# Patient Record
Sex: FEMALE | Race: WHITE | NOT HISPANIC OR LATINO | Employment: STUDENT | RURAL
[De-identification: names, ages, dates, MRNs, and addresses within clinical notes are randomized per-mention and may not be internally consistent; named-entity substitution may affect disease eponyms.]

---
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
## 2022-08-22 NOTE — PROGRESS NOTES
Subjective   Katie Johnson is a 22 y.o. female. Presents to [facility]    Chief Complaint   Patient presents with   • Menstrual Problem       Menstrual Problem  This is a chronic problem. The current episode started more than 1 month ago. The problem occurs intermittently. The problem has been waxing and waning. Pertinent negatives include no abdominal pain, arthralgias, change in bowel habit, chest pain, coughing, fatigue, fever, headaches, nausea, rash, vomiting or weakness. Nothing aggravates the symptoms. She has tried nothing for the symptoms. The treatment provided no relief.      She is still taking her ortho micronor. However she forgets it on occasion and she spots. She has had some heavy menses.     I personally reviewed and updated the patient's allergies, medications, problem list, and past medical, surgical, social, and family history. I have reviewed and confirmed the accuracy of the History of Present Illness and Review of Symptoms as documented by the MA/LPN/RN. Leda Andrews MD    Allergies:  No Known Allergies    Social History:  Social History     Socioeconomic History   • Marital status: Single   Tobacco Use   • Smoking status: Never Smoker   • Smokeless tobacco: Never Used   • Tobacco comment: No smoke exposure       Family History:  Family History   Problem Relation Age of Onset   • Hypertension Father    • Diabetes Father    • Diabetes Paternal Grandfather    • Colon cancer Neg Hx    • Breast cancer Neg Hx        Past Medical History :  Patient Active Problem List   Diagnosis   • Scoliosis (and kyphoscoliosis), idiopathic   • Infected insect bite of right knee   • Junctional nevus of back   • Adolescent idiopathic scoliosis of thoracolumbar spine   • S/P spinal fusion   • Encounter for surveillance of contraceptives   • Dizziness   • Screening for hyperlipidemia   • Routine general medical examination at a health care facility   • Irregular menses       Medication  List:  No current outpatient medications on file.    Past Surgical History:  No past surgical history on file.    Review of Systems:  Review of Systems   Constitutional: Negative for activity change, fatigue and fever.   HENT: Negative for ear pain, rhinorrhea, sinus pressure and voice change.    Eyes: Negative for visual disturbance.   Respiratory: Negative for cough and shortness of breath.    Cardiovascular: Negative for chest pain.   Gastrointestinal: Negative for abdominal pain, change in bowel habit, diarrhea, nausea and vomiting.   Endocrine: Negative for cold intolerance and heat intolerance.   Genitourinary: Positive for menstrual problem. Negative for frequency and urgency.   Musculoskeletal: Negative for arthralgias.   Skin: Negative for rash.   Neurological: Negative for syncope and weakness.   Hematological: Does not bruise/bleed easily.   Psychiatric/Behavioral: Negative for depressed mood. The patient is not nervous/anxious.        Physical Exam:      Vital Signs:    Vitals:    08/24/22 1531   BP: 102/72   Pulse: 108   Resp: 18   Temp: 97.5 °F (36.4 °C)   SpO2: 99%        Wt Readings from Last 3 Encounters:   08/24/22 55.4 kg (122 lb 3.2 oz)   07/27/21 54.4 kg (120 lb) (35 %, Z= -0.39)*   07/16/21 55.1 kg (121 lb 6.4 oz) (38 %, Z= -0.31)*     * Growth percentiles are based on CDC (Girls, 2-20 Years) data.       Result Review :                Physical Exam  Vitals reviewed.   Constitutional:       Appearance: Normal appearance. She is well-developed.   HENT:      Head: Normocephalic and atraumatic.   Eyes:      General:         Right eye: No discharge.         Left eye: No discharge.   Cardiovascular:      Rate and Rhythm: Normal rate and regular rhythm.      Heart sounds: Normal heart sounds. No murmur heard.    No friction rub. No gallop.   Pulmonary:      Effort: Pulmonary effort is normal. No respiratory distress.      Breath sounds: Normal breath sounds. No wheezing or rales.   Abdominal:       General: Abdomen is flat. Bowel sounds are normal. There is no distension.      Palpations: Abdomen is soft. There is no mass.      Tenderness: There is no abdominal tenderness. There is no guarding or rebound.      Hernia: No hernia is present.   Skin:     General: Skin is warm and dry.      Findings: No rash.   Neurological:      Mental Status: She is alert and oriented to person, place, and time.      Coordination: Coordination normal.      Gait: Gait normal.   Psychiatric:         Behavior: Behavior is cooperative.       Medications were reviewed and stopped her ortho micronor. . Labs and tests ordered      CBC & Differential                               Lab Collect, Specimen Types - Blood;, Resulting Agency - [facility] (AMBULATORY) Collection Date-8/24/2022 Collection Time- 4:04 PM, New collection                                         POC Pregnancy, Urine                              Routine                                 TSH                              Lab Collect, Specimen Types - Blood;, Resulting Agency - [facility] ([facility]UL[facility]) Collection Date-8/24/2022 Collection Time- 4:04 PM, New collection                         US Non-ob Transvaginal                              Future, Expected: 8/29/2022, Expires: 8/24/2023, Routine, Hospital Performed  Reason for Exam: irreg menses                 US Pelvis Complete                              Future, Expected: 8/29/2022, Expires: 8/24/2023, Routine, Hospital Performed  Reason for Exam: irreg menses with menorrhagia         Assessment and Plan:  Problems Addressed this Visit        Genitourinary and Reproductive     Irregular menses - Primary     Will get labs and u/s  She has been on ortho micronor and this is most likely the reason. Stop it and see if it helps. Advised to abstain from sex if possible or use a condom in the mean time.          Relevant Orders    CBC & Differential (Completed)    TSH (Completed)    US Pelvis Complete    US  Non-ob Transvaginal    POC Pregnancy, Urine (Completed)      Diagnoses       Codes Comments    Irregular menses    -  Primary ICD-10-CM: N92.6  ICD-9-CM: 626.4            BMI is within normal parameters. No other follow-up for BMI required.      An After Visit Summary and PPPS were given to the patient.       I wore protective equipment throughout this patient encounter to include mask and eyewear. Hand hygiene was performed before donning protective equipment and after removal when leaving the room.

## 2022-08-24 ENCOUNTER — OFFICE VISIT (OUTPATIENT)
Dept: FAMILY MEDICINE CLINIC | Facility: CLINIC | Age: 22
End: 2022-08-24

## 2022-08-24 VITALS
HEART RATE: 108 BPM | DIASTOLIC BLOOD PRESSURE: 72 MMHG | SYSTOLIC BLOOD PRESSURE: 102 MMHG | RESPIRATION RATE: 18 BRPM | BODY MASS INDEX: 19.18 KG/M2 | OXYGEN SATURATION: 99 % | HEIGHT: 67 IN | WEIGHT: 122.2 LBS | TEMPERATURE: 97.5 F

## 2022-08-24 DIAGNOSIS — N92.6 IRREGULAR MENSES: Primary | ICD-10-CM

## 2022-08-24 PROBLEM — D62 POSTOPERATIVE ANEMIA DUE TO ACUTE BLOOD LOSS: Status: RESOLVED | Noted: 2020-09-19 | Resolved: 2022-08-24

## 2022-08-24 LAB
B-HCG UR QL: NEGATIVE
EXPIRATION DATE: NORMAL
INTERNAL NEGATIVE CONTROL: NORMAL
INTERNAL POSITIVE CONTROL: NORMAL
Lab: NORMAL

## 2022-08-24 PROCEDURE — 99213 OFFICE O/P EST LOW 20 MIN: CPT | Performed by: FAMILY MEDICINE

## 2022-08-24 PROCEDURE — 81025 URINE PREGNANCY TEST: CPT | Performed by: FAMILY MEDICINE

## 2022-08-25 LAB
BASOPHILS # BLD AUTO: 0.1 X10E3/UL (ref 0–0.2)
BASOPHILS NFR BLD AUTO: 1 %
EOSINOPHIL # BLD AUTO: 0.2 X10E3/UL (ref 0–0.4)
EOSINOPHIL NFR BLD AUTO: 3 %
ERYTHROCYTE [DISTWIDTH] IN BLOOD BY AUTOMATED COUNT: 12.5 % (ref 11.7–15.4)
HCT VFR BLD AUTO: 40.6 % (ref 34–46.6)
HGB BLD-MCNC: 13.6 G/DL (ref 11.1–15.9)
IMM GRANULOCYTES # BLD AUTO: 0 X10E3/UL (ref 0–0.1)
IMM GRANULOCYTES NFR BLD AUTO: 0 %
LYMPHOCYTES # BLD AUTO: 2.3 X10E3/UL (ref 0.7–3.1)
LYMPHOCYTES NFR BLD AUTO: 31 %
MCH RBC QN AUTO: 30.2 PG (ref 26.6–33)
MCHC RBC AUTO-ENTMCNC: 33.5 G/DL (ref 31.5–35.7)
MCV RBC AUTO: 90 FL (ref 79–97)
MONOCYTES # BLD AUTO: 0.7 X10E3/UL (ref 0.1–0.9)
MONOCYTES NFR BLD AUTO: 9 %
NEUTROPHILS # BLD AUTO: 4.3 X10E3/UL (ref 1.4–7)
NEUTROPHILS NFR BLD AUTO: 56 %
PLATELET # BLD AUTO: 214 X10E3/UL (ref 150–450)
RBC # BLD AUTO: 4.51 X10E6/UL (ref 3.77–5.28)
TSH SERPL DL<=0.005 MIU/L-ACNC: 1.04 UIU/ML (ref 0.45–4.5)
WBC # BLD AUTO: 7.6 X10E3/UL (ref 3.4–10.8)

## 2022-08-30 NOTE — ASSESSMENT & PLAN NOTE
Will get labs and u/s  She has been on ortho micronor and this is most likely the reason. Stop it and see if it helps. Advised to abstain from sex if possible or use a condom in the mean time.

## 2022-09-06 ENCOUNTER — HOSPITAL ENCOUNTER (OUTPATIENT)
Dept: ULTRASOUND IMAGING | Facility: HOSPITAL | Age: 22
End: 2022-09-06

## 2022-09-07 NOTE — PROGRESS NOTES
"  Chief Complaint   Patient presents with   • Annual Exam         Subjective   Katie Johnson is a 22 y.o. female here for a Annual Visit.     Last Physical Exam: 07/27/2021 Previous physical was performed by  Leda Andrews MD  General Health:good  Contraceptive History:none  Nutrition:in general, a \"healthy\" diet    Exercise Level:not at all  Sleep:fairly well  Hours of Sleep:6  Libido:fair  Self Skin Exam: occasionally  Monthly Breast Self Exam:Does not perform monthly breast exam    Pap Smear:  Last Completed Pap Smear     This patient has no relevant Health Maintenance data.       Findings on last pap: patient has never had a pap test        History of Present Illness     I personally reviewed and updated the patient's allergies, medications, problem list, and past medical, surgical, social, and family history.     Allergies:  No Known Allergies    Social History:  Social History     Socioeconomic History   • Marital status: Single   Tobacco Use   • Smoking status: Never Smoker   • Smokeless tobacco: Never Used   • Tobacco comment: No smoke exposure       Family History:  Family History   Problem Relation Age of Onset   • Hypertension Father    • Diabetes Father    • Diabetes Paternal Grandfather    • Colon cancer Neg Hx    • Breast cancer Neg Hx        Past Medical History :  Active Ambulatory Problems     Diagnosis Date Noted   • Scoliosis (and kyphoscoliosis), idiopathic 09/30/2015   • Infected insect bite of right knee 05/28/2020   • Junctional nevus of back 06/04/2020   • Adolescent idiopathic scoliosis of thoracolumbar spine 07/17/2020   • S/P spinal fusion 02/11/2021   • Dizziness 07/16/2021   • Screening for hyperlipidemia 07/27/2021   • Routine general medical examination at a health care facility 07/27/2021   • Irregular menses 08/24/2022   • Birth control counseling 09/08/2022   • HPV vaccine counseling 09/08/2022   • Skin lesion of back 09/08/2022     Resolved Ambulatory Problems     Diagnosis " "Date Noted   • Postoperative anemia due to acute blood loss 09/19/2020   • Encounter for surveillance of contraceptives 07/16/2021     Past Medical History:   Diagnosis Date   • Cellulitis of knee, left    • Lice infested hair    • Other viral warts    • Plantar wart, right foot        Medication List:  No current outpatient medications on file.    Past Surgical History:  No past surgical history on file.    Depression Screen:   PHQ-2/PHQ-9 Depression Screening 8/24/2022   Retired Total Score -   Little Interest or Pleasure in Doing Things 0-->not at all   Feeling Down, Depressed or Hopeless 0-->not at all   PHQ-9: Brief Depression Severity Measure Score 0       Fall Risk Screen:  SANDRA Fall Risk Assessment has not been completed.    Review Of Systems:  Review of Systems   Constitutional: Negative for activity change, appetite change and fever.   HENT: Negative for rhinorrhea.    Gastrointestinal: Negative for constipation and diarrhea.   Genitourinary: Negative for dysuria and hematuria.   Neurological: Negative for dizziness and confusion.   Psychiatric/Behavioral: The patient is not nervous/anxious.        Physical Exam:  Vital Signs:  Visit Vitals  /70 (BP Location: Right arm, Patient Position: Sitting, Cuff Size: Adult)   Pulse 112   Temp 97.7 °F (36.5 °C) (Temporal)   Resp 18   Ht 170.2 cm (67\")   Wt 55.1 kg (121 lb 6.4 oz)   LMP 08/11/2022   SpO2 99% Comment: room air   BMI 19.01 kg/m²       Body mass index is 19.01 kg/m².      Result Review :            Physical Exam  Vitals and nursing note reviewed.   Constitutional:       General: She is not in acute distress.     Appearance: She is well-developed. She is not diaphoretic.   HENT:      Head: Normocephalic and atraumatic.      Right Ear: Tympanic membrane and external ear normal.      Left Ear: Tympanic membrane and external ear normal.      Nose: Nose normal.      Mouth/Throat:      Mouth: Mucous membranes are moist.      Pharynx: No oropharyngeal " exudate.   Eyes:      General: No scleral icterus.        Right eye: No discharge.         Left eye: No discharge.      Conjunctiva/sclera: Conjunctivae normal.      Pupils: Pupils are equal, round, and reactive to light.   Neck:      Thyroid: No thyromegaly.      Trachea: No tracheal deviation.   Cardiovascular:      Rate and Rhythm: Normal rate and regular rhythm.      Heart sounds: Normal heart sounds. No murmur heard.    No friction rub. No gallop.   Pulmonary:      Effort: Pulmonary effort is normal. No respiratory distress.      Breath sounds: Normal breath sounds. No stridor. No wheezing or rales.   Abdominal:      General: Bowel sounds are normal. There is no distension.      Palpations: Abdomen is soft. There is no mass.      Tenderness: There is no abdominal tenderness. There is no guarding or rebound.   Musculoskeletal:         General: No tenderness or deformity. Normal range of motion.      Cervical back: Normal range of motion and neck supple.   Lymphadenopathy:      Cervical: No cervical adenopathy.   Skin:     General: Skin is warm and dry.      Capillary Refill: Capillary refill takes less than 2 seconds.      Coloration: Skin is not pale.      Findings: No erythema or rash.      Comments: Papule, (looks like scarring) left upper back   Neurological:      Mental Status: She is alert and oriented to person, place, and time.      Cranial Nerves: No cranial nerve deficit.      Sensory: No sensory deficit.      Motor: No tremor, atrophy or abnormal muscle tone.      Coordination: Coordination normal.      Gait: Gait normal.      Deep Tendon Reflexes: Reflexes are normal and symmetric. Reflexes normal.   Psychiatric:         Behavior: Behavior normal.         Thought Content: Thought content normal.         Cognition and Memory: Memory is not impaired. She does not exhibit impaired recent memory or impaired remote memory.         Judgment: Judgment normal.           Assessment and Plan:  Problem List  Items Addressed This Visit        Cardiac and Vasculature    Screening for hyperlipidemia       Genitourinary and Reproductive     Birth control counseling    HPV vaccine counseling       Health Encounters    Routine general medical examination at a health care facility - Primary    Relevant Orders    POCT urinalysis dipstick, manual (Completed)    CBC & Differential    Comprehensive Metabolic Panel    TSH       Skin    Skin lesion of back    Overview     Left upper back: benign appearance, declines derm  She would like to monitor           Other Visit Diagnoses     Need for hepatitis C screening test        Relevant Orders    Hepatitis C Antibody    Screening for chlamydial disease        Relevant Orders    Chlamydia trachomatis, Neisseria gonorrhoeae, PCR - Urine, Urine, Clean Catch (Completed)          BMI is within normal parameters. No other follow-up for BMI required.       An After Visit Summary and PPPS were given to the patient.       Discussed injury prevention, diet and exercise, safe sexual practices, and screening for common diseases. Encouraged use of sunscreen and seatbelts. Discussed timing of  cervical cancer screening. Encouraged monthly self-breast exams, yearly clinical breast exams, and discussed timing of mammograms. Avoidance of tobacco encouraged. Limitation or avoidance of alcohol encouraged. Recommend yearly dental and eye exams. Also discussed monitoring of blood pressure, lipids.     I wore protective equipment throughout this patient encounter to include mask. Hand hygiene was performed before donning protective equipment and after removal when leaving the room.

## 2022-09-08 ENCOUNTER — OFFICE VISIT (OUTPATIENT)
Dept: FAMILY MEDICINE CLINIC | Facility: CLINIC | Age: 22
End: 2022-09-08

## 2022-09-08 VITALS
HEIGHT: 67 IN | BODY MASS INDEX: 19.06 KG/M2 | OXYGEN SATURATION: 99 % | WEIGHT: 121.4 LBS | TEMPERATURE: 97.7 F | RESPIRATION RATE: 18 BRPM | SYSTOLIC BLOOD PRESSURE: 110 MMHG | DIASTOLIC BLOOD PRESSURE: 70 MMHG | HEART RATE: 112 BPM

## 2022-09-08 DIAGNOSIS — Z11.8 SCREENING FOR CHLAMYDIAL DISEASE: ICD-10-CM

## 2022-09-08 DIAGNOSIS — Z13.220 SCREENING FOR HYPERLIPIDEMIA: ICD-10-CM

## 2022-09-08 DIAGNOSIS — L98.9 SKIN LESION OF BACK: ICD-10-CM

## 2022-09-08 DIAGNOSIS — Z71.85 HPV VACCINE COUNSELING: ICD-10-CM

## 2022-09-08 DIAGNOSIS — Z11.59 NEED FOR HEPATITIS C SCREENING TEST: ICD-10-CM

## 2022-09-08 DIAGNOSIS — Z30.09 BIRTH CONTROL COUNSELING: ICD-10-CM

## 2022-09-08 DIAGNOSIS — Z00.00 ROUTINE GENERAL MEDICAL EXAMINATION AT A HEALTH CARE FACILITY: Primary | ICD-10-CM

## 2022-09-08 PROBLEM — Z30.40 ENCOUNTER FOR SURVEILLANCE OF CONTRACEPTIVES: Status: RESOLVED | Noted: 2021-07-16 | Resolved: 2022-09-08

## 2022-09-08 LAB
BILIRUB BLD-MCNC: NEGATIVE MG/DL
CLARITY, POC: CLEAR
COLOR UR: YELLOW
GLUCOSE UR STRIP-MCNC: NEGATIVE MG/DL
KETONES UR QL: NEGATIVE
LEUKOCYTE EST, POC: NEGATIVE
NITRITE UR-MCNC: NEGATIVE MG/ML
PH UR: 5 [PH] (ref 5–8)
PROT UR STRIP-MCNC: NEGATIVE MG/DL
RBC # UR STRIP: NEGATIVE /UL
SP GR UR: 1.01 (ref 1–1.03)
UROBILINOGEN UR QL: NORMAL

## 2022-09-08 PROCEDURE — 99395 PREV VISIT EST AGE 18-39: CPT | Performed by: FAMILY MEDICINE

## 2022-09-08 PROCEDURE — 81002 URINALYSIS NONAUTO W/O SCOPE: CPT | Performed by: FAMILY MEDICINE

## 2022-09-10 LAB
C TRACH RRNA SPEC QL NAA+PROBE: NEGATIVE
N GONORRHOEA RRNA SPEC QL NAA+PROBE: NEGATIVE

## 2022-09-13 ENCOUNTER — HOSPITAL ENCOUNTER (OUTPATIENT)
Dept: ULTRASOUND IMAGING | Facility: HOSPITAL | Age: 22
Discharge: HOME | End: 2022-09-13
Admitting: FAMILY MEDICINE

## 2022-09-13 DIAGNOSIS — N92.6 IRREGULAR MENSES: ICD-10-CM

## 2022-09-13 PROCEDURE — 76856 US EXAM PELVIC COMPLETE: CPT

## 2022-09-13 PROCEDURE — 76830 TRANSVAGINAL US NON-OB: CPT

## 2023-01-03 NOTE — PROGRESS NOTES
Subjective   Katie Johnson is a 23 y.o. female. Presents to [facility]    Chief Complaint   Patient presents with   • Skin Lesion   • Contraception       History of Present Illness  Lesion:   Katie Johnson is here today for a lesion. The lesion appeared gradual and has been occurring for a couple years.. The course has been decreasing in size. The lesion is characterized as no sign of infection. The lesion is located on back. The symptoms have been associated with none.     Contraception:  Katie Johnson is here today discuss contraception methods. She is -0, Parity-0. LMP- 2022 She is of childbearing age She is sexually active.  The reason for contraception include pregnancy prevention and cycle control Associated symptoms include none. Mentruation pattern is described as regular, predictable and occur monthly. Current contraceptive treatment includes no method at present time.          I personally reviewed and updated the patient's allergies, medications, problem list, and past medical, surgical, social, and family history. I have reviewed and confirmed the accuracy of the History of Present Illness and Review of Symptoms as documented by the MA/LPN/RN. Leda Andrews MD    Allergies:  No Known Allergies    Social History:  Social History     Socioeconomic History   • Marital status: Single   Tobacco Use   • Smoking status: Never   • Smokeless tobacco: Never   • Tobacco comments:     No smoke exposure       Family History:  Family History   Problem Relation Age of Onset   • Hypertension Father    • Diabetes Father    • Diabetes Paternal Grandfather    • Colon cancer Neg Hx    • Breast cancer Neg Hx        Past Medical History :  Patient Active Problem List   Diagnosis   • Scoliosis (and kyphoscoliosis), idiopathic   • Infected insect bite of right knee   • Junctional nevus of back   • Adolescent idiopathic scoliosis of thoracolumbar spine   • S/P spinal fusion   •  Dizziness   • Screening for hyperlipidemia   • Routine general medical examination at a health care facility   • Irregular menses   • Birth control counseling   • HPV vaccine counseling   • Skin lesion of back       Medication List:    Current Outpatient Medications:   •  norethindrone (Ortho Micronor) 0.35 MG tablet, Take 1 tablet by mouth Daily., Disp: 28 tablet, Rfl: 12    Past Surgical History:  No past surgical history on file.      Physical Exam:      Vital Signs:    Vitals:    01/05/23 1216   BP: 120/80   Pulse: 100   Resp: 18   Temp: 97.5 °F (36.4 °C)   SpO2: 99%        Wt Readings from Last 3 Encounters:   01/05/23 54.6 kg (120 lb 6.4 oz)   09/08/22 55.1 kg (121 lb 6.4 oz)   08/24/22 55.4 kg (122 lb 3.2 oz)       Result Review :                Physical Exam  Vitals reviewed.   Constitutional:       Appearance: Normal appearance. She is well-developed.   HENT:      Head: Normocephalic and atraumatic.   Eyes:      General:         Right eye: No discharge.         Left eye: No discharge.   Cardiovascular:      Rate and Rhythm: Normal rate and regular rhythm.      Heart sounds: Normal heart sounds. No murmur heard.    No friction rub. No gallop.   Pulmonary:      Effort: Pulmonary effort is normal. No respiratory distress.      Breath sounds: Normal breath sounds. No wheezing or rales.   Skin:     General: Skin is warm and dry.      Findings: No rash.      Comments: Nodule left post left shoulder, keloid like papule   Neurological:      Mental Status: She is alert and oriented to person, place, and time.      Coordination: Coordination normal.      Gait: Gait normal.   Psychiatric:         Behavior: Behavior is cooperative.         Assessment and Plan:  Problems Addressed this Visit        Genitourinary and Reproductive     Birth control counseling     Discussed birth control pills, its usage and its benefits of shorter lighter periods.  She has no personal or family history of DVT or PE.               Skin     Skin lesion of back     Left upper back: benign appearance, declines derm  She would like to monitor        Other Visit Diagnoses     Encounter for other general counseling or advice on contraception    -  Primary    Restart orthomicronor    Relevant Medications    norethindrone (Ortho Micronor) 0.35 MG tablet    Other Relevant Orders    POCT pregnancy, urine (Completed)      Diagnoses       Codes Comments    Encounter for other general counseling or advice on contraception    -  Primary ICD-10-CM: Z30.09  ICD-9-CM: V25.09 Restart orthomicronor    Skin lesion of back     ICD-10-CM: L98.9  ICD-9-CM: 709.9     Birth control counseling     ICD-10-CM: Z30.09  ICD-9-CM: V25.09            BMI is within normal parameters. No other follow-up for BMI required.      An After Visit Summary and PPPS were given to the patient.       I wore protective equipment throughout this patient encounter to include mask and eyewear. Hand hygiene was performed before donning protective equipment and after removal when leaving the room.

## 2023-01-05 ENCOUNTER — OFFICE VISIT (OUTPATIENT)
Dept: FAMILY MEDICINE CLINIC | Facility: CLINIC | Age: 23
End: 2023-01-05
Payer: COMMERCIAL

## 2023-01-05 VITALS
RESPIRATION RATE: 18 BRPM | BODY MASS INDEX: 18.9 KG/M2 | HEART RATE: 100 BPM | WEIGHT: 120.4 LBS | HEIGHT: 67 IN | DIASTOLIC BLOOD PRESSURE: 80 MMHG | OXYGEN SATURATION: 99 % | TEMPERATURE: 97.5 F | SYSTOLIC BLOOD PRESSURE: 120 MMHG

## 2023-01-05 DIAGNOSIS — L98.9 SKIN LESION OF BACK: ICD-10-CM

## 2023-01-05 DIAGNOSIS — Z30.09 BIRTH CONTROL COUNSELING: ICD-10-CM

## 2023-01-05 DIAGNOSIS — Z30.09 ENCOUNTER FOR OTHER GENERAL COUNSELING OR ADVICE ON CONTRACEPTION: Primary | ICD-10-CM

## 2023-01-05 PROCEDURE — 99213 OFFICE O/P EST LOW 20 MIN: CPT | Performed by: FAMILY MEDICINE

## 2023-01-05 PROCEDURE — 81025 URINE PREGNANCY TEST: CPT | Performed by: FAMILY MEDICINE

## 2023-01-05 RX ORDER — ACETAMINOPHEN AND CODEINE PHOSPHATE 120; 12 MG/5ML; MG/5ML
1 SOLUTION ORAL DAILY
Qty: 28 TABLET | Refills: 12 | Status: SHIPPED | OUTPATIENT
Start: 2023-01-05 | End: 2024-01-05

## 2023-01-09 NOTE — ASSESSMENT & PLAN NOTE
Discussed birth control pills, its usage and its benefits of shorter lighter periods.  She has no personal or family history of DVT or PE.

## 2023-05-02 ENCOUNTER — TELEPHONE (OUTPATIENT)
Dept: FAMILY MEDICINE CLINIC | Facility: CLINIC | Age: 23
End: 2023-05-02
Payer: COMMERCIAL

## 2023-05-02 NOTE — TELEPHONE ENCOUNTER
Called and spoke to patient about message regarding lab orders. She stated another place requested those orders and they were ordering them.

## 2023-05-02 NOTE — TELEPHONE ENCOUNTER
Caller: Katie Johnson    Relationship: Self    Best call back number: [phone]    What orders are you requesting (i.e. lab or imaging): CBC AND CMP ORDERS     In what timeframe would the patient need to come in: ASAP    Where will you receive your lab/imaging services: IN OFFICE     Additional notes:     PATIENT GOT STUCK WITH A NEEDLE AT HER JOB.  SHE HAD TO GET BLOOD WORK DONE TO MAKE SURE SHE WAS CLEAN, SHE IS. SHE HAD THESE RAN 2 WEEKS AGO.    WHILE DOING THESE LABS THEY NOTICED ELEVATED CBC AND CMP AND THE DOCTOR AT [facility] WANTED HER TO GET THESE RECHECKED.      SHE HAS ORDERS IN HAND TO GET THESE REDRAWN BUT WONDERED IF OUR OFFICE TOOK OUTSIDE ORDERS.    PLEASE GIVE HER A CALLBACK

## 2023-11-13 ENCOUNTER — TELEPHONE (OUTPATIENT)
Dept: FAMILY MEDICINE CLINIC | Facility: CLINIC | Age: 23
End: 2023-11-13
Payer: COMMERCIAL

## 2023-11-13 NOTE — TELEPHONE ENCOUNTER
LVM     ALL PHYSICAL AND WELLNESS VISITS NEED TO BE WITH PCP  THIS APPT NEED TO RESCHEDULE WITH WITH DR MONIE SAINI TO READ

## 2023-12-06 NOTE — PROGRESS NOTES
"  Office Note     Name: Katie Johnson    : 2000     MRN: [id number]     Chief Complaint  URI    Subjective     Katie Johnson presents to [facility] for an acute complaint of URI.     History of Present Illness  Katie recently went to [facility] after hours. They tested her for covid , flu and strep and states all were negative. She is very fatigued. She has ran a fever, highest has been 100.5. This  was last Monday and Tuesday.  URI   This is a new problem. The current episode started in the past 7 days. The problem has been unchanged. The maximum temperature recorded prior to her arrival was 100.4 - 100.9 F. Associated symptoms include congestion, coughing and a sore throat.     No fever today.  Productive cough with green mucus x 1 week. Coughing x 2 weeks. No CXR at urgent care. Face pressure, congestion, throat is sore from coughing, and she is wheezing. She feels like her insides are \"raw\" due to the amount of coughing. She does have some ear popping. She has muscle aches and fatigue.    She  has taken otc- cold and flu, a mucus relief product and mucus nasal spray.     She does not have seasonal allergies.    She does not smoke.   She is a dialysis nurse.     Discussed antibiotics may decrease efficacy of OCP.  Use backup method.      Current Outpatient Medications:     norethindrone (Ortho Micronor) 0.35 MG tablet, Take 1 tablet by mouth Daily., Disp: 28 tablet, Rfl: 12    albuterol sulfate  (90 Base) MCG/ACT inhaler, Inhale 2 puffs Every 4 (Four) Hours As Needed for Wheezing., Disp: 6.7 g, Rfl: 0    amoxicillin-clavulanate (AUGMENTIN) 875-125 MG per tablet, Take 1 tablet by mouth 2 (Two) Times a Day., Disp: 20 tablet, Rfl: 0    brompheniramine-pseudoephedrine-DM 30-2-10 MG/5ML syrup, Take 10 mL by mouth 4 (Four) Times a Day As Needed for Congestion or Cough., Disp: 300 mL, Rfl: 0    methylPREDNISolone (MEDROL) 4 MG dose pack, Take as directed on " "package instructions., Disp: 21 tablet, Rfl: 0    No Known Allergies    History reviewed. No pertinent surgical history.     Family History   Problem Relation Age of Onset    Hypertension Father     Diabetes Father     Diabetes Paternal Grandfather     Colon cancer Neg Hx     Breast cancer Neg Hx             8/24/2022     3:33 PM   PHQ-2/PHQ-9 Depression Screening   Little Interest or Pleasure in Doing Things 0-->not at all   Feeling Down, Depressed or Hopeless 0-->not at all   PHQ-9: Brief Depression Severity Measure Score 0       Review of Systems   HENT:  Positive for congestion and sore throat.    Respiratory:  Positive for cough.        Objective     /78 (BP Location: Right arm, Patient Position: Sitting, Cuff Size: Adult)   Pulse 109   Temp 98 °F (36.7 °C) (Temporal)   Resp 18   Ht 170.2 cm (67.01\")   Wt 54 kg (119 lb)   SpO2 97%   BMI 18.63 kg/m²     BP Readings from Last 2 Encounters:   12/08/23 112/78   01/05/23 120/80       Wt Readings from Last 2 Encounters:   12/08/23 54 kg (119 lb)   01/05/23 54.6 kg (120 lb 6.4 oz)       BMI is within normal parameters. No other follow-up for BMI required.         Physical Exam  Vitals and nursing note reviewed.   Constitutional:       General: She is not in acute distress.     Appearance: She is well-groomed. She is ill-appearing. She is not toxic-appearing or diaphoretic.   HENT:      Head: Normocephalic and atraumatic.      Right Ear: Ear canal and external ear normal. A middle ear effusion is present.      Left Ear: Ear canal and external ear normal. A middle ear effusion is present.      Nose: Congestion and rhinorrhea present.      Right Sinus: Maxillary sinus tenderness and frontal sinus tenderness present.      Left Sinus: Maxillary sinus tenderness and frontal sinus tenderness present.      Mouth/Throat:      Lips: Pink. No lesions.      Pharynx: Uvula midline. Posterior oropharyngeal erythema present.      Tonsils: 0 on the right. 0 on the left. "   Eyes:      General: Lids are normal. Vision grossly intact. Gaze aligned appropriately. Allergic shiner present. No visual field deficit.        Right eye: No discharge.         Left eye: No discharge.      Extraocular Movements: Extraocular movements intact.      Pupils: Pupils are equal, round, and reactive to light. Pupils are equal.   Neck:      Vascular: No carotid bruit.   Cardiovascular:      Rate and Rhythm: Normal rate and regular rhythm.      Heart sounds: Normal heart sounds. No murmur heard.  Pulmonary:      Effort: Pulmonary effort is normal. No respiratory distress.      Breath sounds: Normal air entry. No stridor or decreased air movement. No decreased breath sounds, wheezing, rhonchi or rales.   Chest:      Chest wall: No tenderness.   Musculoskeletal:      Cervical back: Normal range of motion and neck supple.   Lymphadenopathy:      Cervical: Cervical adenopathy present.   Skin:     General: Skin is warm and dry.   Neurological:      General: No focal deficit present.      Mental Status: She is alert and oriented to person, place, and time. Mental status is at baseline.   Psychiatric:         Attention and Perception: Attention and perception normal.         Mood and Affect: Mood normal.         Behavior: Behavior normal. Behavior is cooperative.       Derm Physical Exam  Result Review :{ Labs  Result Review  Imaging  Med Tab  Media   Assessment and Plan      {CC Problem List  Visit Diagnosis  ROS  Review (Popup)  Health Maintenance  Quality  BestPractice  Medications  SmartSets  SnapShot Encounters  Media   Problems Addressed this Visit    None  Visit Diagnoses       Acute URI    -  Primary    Relevant Medications    amoxicillin-clavulanate (AUGMENTIN) 875-125 MG per tablet    Other Relevant Orders    POCT SARS-CoV-2 Antigen JANI + Flu (Completed)    POCT rapid strep A (Completed)    Sore throat        Relevant Orders    POCT rapid strep A (Completed)    Fluid level behind  tympanic membrane of both ears        Relevant Medications    amoxicillin-clavulanate (AUGMENTIN) 875-125 MG per tablet    brompheniramine-pseudoephedrine-DM 30-2-10 MG/5ML syrup    methylPREDNISolone (MEDROL) 4 MG dose pack    Acute non-recurrent pansinusitis        Relevant Medications    amoxicillin-clavulanate (AUGMENTIN) 875-125 MG per tablet    methylPREDNISolone (MEDROL) 4 MG dose pack    Pharyngitis, unspecified etiology        Relevant Medications    amoxicillin-clavulanate (AUGMENTIN) 875-125 MG per tablet    brompheniramine-pseudoephedrine-DM 30-2-10 MG/5ML syrup          Diagnoses         Codes Comments    Acute URI    -  Primary ICD-10-CM: J06.9  ICD-9-CM: 465.9     Sore throat     ICD-10-CM: J02.9  ICD-9-CM: 462     Fluid level behind tympanic membrane of both ears     ICD-10-CM: H65.93  ICD-9-CM: 381.4     Acute non-recurrent pansinusitis     ICD-10-CM: J01.40  ICD-9-CM: 461.8     Pharyngitis, unspecified etiology     ICD-10-CM: J02.9  ICD-9-CM: 462            Procedures    Problem List Items Addressed This Visit    None  Visit Diagnoses       Acute URI    -  Primary    Relevant Medications    amoxicillin-clavulanate (AUGMENTIN) 875-125 MG per tablet    Other Relevant Orders    POCT SARS-CoV-2 Antigen JANI + Flu (Completed)    POCT rapid strep A (Completed)    Sore throat        Relevant Orders    POCT rapid strep A (Completed)    Fluid level behind tympanic membrane of both ears        Relevant Medications    amoxicillin-clavulanate (AUGMENTIN) 875-125 MG per tablet    brompheniramine-pseudoephedrine-DM 30-2-10 MG/5ML syrup    methylPREDNISolone (MEDROL) 4 MG dose pack    Acute non-recurrent pansinusitis        Relevant Medications    amoxicillin-clavulanate (AUGMENTIN) 875-125 MG per tablet    methylPREDNISolone (MEDROL) 4 MG dose pack    Pharyngitis, unspecified etiology        Relevant Medications    amoxicillin-clavulanate (AUGMENTIN) 875-125 MG per tablet    brompheniramine-pseudoephedrine-DM  30-2-10 MG/5ML syrup                 Wrapup Tab  Return for Follow up with primary care provider as needed..     Patient Instructions   Continue current plan of care as discussed.   Take medication as ordered (if applicable).    Change toothbrush after 24 hour antibiotic use.  Gargle with warm salt water for symptomatic relief of sore throat.   Stay hydrated.     Practice good sleep hygiene.  Eat a well balanced diet with fresh fruit and vegetables.    Drink at least 8 bottles of water or equivalent per day.     Limit sweetened beverages, sodas, juices.    Bake, boil, broil or grill your food, avoid eating fried foods.   Exercise at least 150 minutes per week.      Patient was given instructions and counseling regarding her condition or for health maintenance advice. Please see specific information pulled into the AVS if appropriate.  Hand hygiene was performed during entrance to exam room and following assessment of patient. This document is intended for medical expert use only.     EMR Dragon/Transcription disclaimer:   Much of this encounter note is an electronic transcription/translation of spoken language to printed text. The electronic translation of spoken language may permit erroneous, or at times, nonsensical words or phrases to be inadvertently transcribed.      ARLIN Mirza, FNP-C  [facility] [address]  [facility]  [address] [place] [address] [address]  [place] [address] [phone]

## 2023-12-08 ENCOUNTER — OFFICE VISIT (OUTPATIENT)
Dept: FAMILY MEDICINE CLINIC | Facility: CLINIC | Age: 23
End: 2023-12-08
Payer: COMMERCIAL

## 2023-12-08 VITALS
OXYGEN SATURATION: 97 % | HEIGHT: 67 IN | WEIGHT: 119 LBS | SYSTOLIC BLOOD PRESSURE: 112 MMHG | DIASTOLIC BLOOD PRESSURE: 78 MMHG | HEART RATE: 109 BPM | TEMPERATURE: 98 F | RESPIRATION RATE: 18 BRPM | BODY MASS INDEX: 18.68 KG/M2

## 2023-12-08 DIAGNOSIS — J02.9 SORE THROAT: ICD-10-CM

## 2023-12-08 DIAGNOSIS — J01.40 ACUTE NON-RECURRENT PANSINUSITIS: ICD-10-CM

## 2023-12-08 DIAGNOSIS — H65.93 FLUID LEVEL BEHIND TYMPANIC MEMBRANE OF BOTH EARS: ICD-10-CM

## 2023-12-08 DIAGNOSIS — J02.9 PHARYNGITIS, UNSPECIFIED ETIOLOGY: ICD-10-CM

## 2023-12-08 DIAGNOSIS — J06.9 ACUTE URI: Primary | ICD-10-CM

## 2023-12-08 LAB
EXPIRATION DATE: NORMAL
EXPIRATION DATE: NORMAL
FLUAV AG UPPER RESP QL IA.RAPID: NOT DETECTED
FLUBV AG UPPER RESP QL IA.RAPID: NOT DETECTED
INTERNAL CONTROL: NORMAL
INTERNAL CONTROL: NORMAL
Lab: NORMAL
Lab: NORMAL
S PYO AG THROAT QL: NEGATIVE
SARS-COV-2 AG UPPER RESP QL IA.RAPID: NOT DETECTED

## 2023-12-08 PROCEDURE — 87428 SARSCOV & INF VIR A&B AG IA: CPT

## 2023-12-08 PROCEDURE — 99213 OFFICE O/P EST LOW 20 MIN: CPT

## 2023-12-08 PROCEDURE — 87880 STREP A ASSAY W/OPTIC: CPT

## 2023-12-08 RX ORDER — ALBUTEROL SULFATE 90 UG/1
2 AEROSOL, METERED RESPIRATORY (INHALATION) EVERY 4 HOURS PRN
Qty: 6.7 G | Refills: 0 | Status: SHIPPED | OUTPATIENT
Start: 2023-12-08

## 2023-12-08 RX ORDER — BROMPHENIRAMINE MALEATE, PSEUDOEPHEDRINE HYDROCHLORIDE, AND DEXTROMETHORPHAN HYDROBROMIDE 2; 30; 10 MG/5ML; MG/5ML; MG/5ML
10 SYRUP ORAL 4 TIMES DAILY PRN
Qty: 300 ML | Refills: 0 | Status: SHIPPED | OUTPATIENT
Start: 2023-12-08 | End: 2023-12-11

## 2023-12-08 RX ORDER — METHYLPREDNISOLONE 4 MG/1
TABLET ORAL
Qty: 21 TABLET | Refills: 0 | Status: SHIPPED | OUTPATIENT
Start: 2023-12-08

## 2023-12-08 RX ORDER — AMOXICILLIN AND CLAVULANATE POTASSIUM 875; 125 MG/1; MG/1
1 TABLET, FILM COATED ORAL 2 TIMES DAILY
Qty: 20 TABLET | Refills: 0 | Status: SHIPPED | OUTPATIENT
Start: 2023-12-08

## 2023-12-11 ENCOUNTER — OFFICE VISIT (OUTPATIENT)
Dept: FAMILY MEDICINE CLINIC | Facility: CLINIC | Age: 23
End: 2023-12-11
Payer: COMMERCIAL

## 2023-12-11 VITALS
OXYGEN SATURATION: 97 % | HEART RATE: 120 BPM | WEIGHT: 120.6 LBS | TEMPERATURE: 97.1 F | SYSTOLIC BLOOD PRESSURE: 110 MMHG | DIASTOLIC BLOOD PRESSURE: 70 MMHG | HEIGHT: 67 IN | RESPIRATION RATE: 18 BRPM | BODY MASS INDEX: 18.93 KG/M2

## 2023-12-11 DIAGNOSIS — Z12.4 SCREENING FOR CERVICAL CANCER: ICD-10-CM

## 2023-12-11 DIAGNOSIS — Z13.220 SCREENING FOR HYPERLIPIDEMIA: ICD-10-CM

## 2023-12-11 DIAGNOSIS — Z00.00 ENCOUNTER FOR WELLNESS EXAMINATION: Primary | ICD-10-CM

## 2023-12-11 PROCEDURE — 99395 PREV VISIT EST AGE 18-39: CPT | Performed by: FAMILY MEDICINE

## 2023-12-13 LAB
ALBUMIN SERPL-MCNC: 4.9 G/DL (ref 4–5)
ALBUMIN/GLOB SERPL: 1.5 {RATIO} (ref 1.2–2.2)
ALP SERPL-CCNC: 70 IU/L (ref 44–121)
ALT SERPL-CCNC: 17 IU/L (ref 0–32)
AST SERPL-CCNC: 19 IU/L (ref 0–40)
BASOPHILS # BLD AUTO: 0.1 X10E3/UL (ref 0–0.2)
BASOPHILS NFR BLD AUTO: 1 %
BILIRUB SERPL-MCNC: 1.8 MG/DL (ref 0–1.2)
BUN SERPL-MCNC: 10 MG/DL (ref 6–20)
BUN/CREAT SERPL: 13 (ref 9–23)
CALCIUM SERPL-MCNC: 10 MG/DL (ref 8.7–10.2)
CHLORIDE SERPL-SCNC: 100 MMOL/L (ref 96–106)
CHOLEST SERPL-MCNC: 157 MG/DL (ref 100–199)
CHOLEST/HDLC SERPL: 3.4 RATIO (ref 0–4.4)
CO2 SERPL-SCNC: 24 MMOL/L (ref 20–29)
CREAT SERPL-MCNC: 0.8 MG/DL (ref 0.57–1)
EGFRCR SERPLBLD CKD-EPI 2021: 107 ML/MIN/1.73
EOSINOPHIL # BLD AUTO: 0.1 X10E3/UL (ref 0–0.4)
EOSINOPHIL NFR BLD AUTO: 1 %
ERYTHROCYTE [DISTWIDTH] IN BLOOD BY AUTOMATED COUNT: 12 % (ref 11.7–15.4)
GLOBULIN SER CALC-MCNC: 3.2 G/DL (ref 1.5–4.5)
GLUCOSE SERPL-MCNC: 87 MG/DL (ref 70–99)
HCT VFR BLD AUTO: 43.6 % (ref 34–46.6)
HDLC SERPL-MCNC: 46 MG/DL
HGB BLD-MCNC: 14.5 G/DL (ref 11.1–15.9)
IMM GRANULOCYTES # BLD AUTO: 0 X10E3/UL (ref 0–0.1)
IMM GRANULOCYTES NFR BLD AUTO: 0 %
LDLC SERPL CALC-MCNC: 93 MG/DL (ref 0–99)
LYMPHOCYTES # BLD AUTO: 2.7 X10E3/UL (ref 0.7–3.1)
LYMPHOCYTES NFR BLD AUTO: 29 %
MCH RBC QN AUTO: 29.6 PG (ref 26.6–33)
MCHC RBC AUTO-ENTMCNC: 33.3 G/DL (ref 31.5–35.7)
MCV RBC AUTO: 89 FL (ref 79–97)
MONOCYTES # BLD AUTO: 0.7 X10E3/UL (ref 0.1–0.9)
MONOCYTES NFR BLD AUTO: 7 %
NEUTROPHILS # BLD AUTO: 6 X10E3/UL (ref 1.4–7)
NEUTROPHILS NFR BLD AUTO: 62 %
PLATELET # BLD AUTO: 259 X10E3/UL (ref 150–450)
POTASSIUM SERPL-SCNC: 3.7 MMOL/L (ref 3.5–5.2)
PROT SERPL-MCNC: 8.1 G/DL (ref 6–8.5)
RBC # BLD AUTO: 4.9 X10E6/UL (ref 3.77–5.28)
SODIUM SERPL-SCNC: 141 MMOL/L (ref 134–144)
TRIGL SERPL-MCNC: 96 MG/DL (ref 0–149)
TSH SERPL DL<=0.005 MIU/L-ACNC: 1.13 UIU/ML (ref 0.45–4.5)
VLDLC SERPL CALC-MCNC: 18 MG/DL (ref 5–40)
WBC # BLD AUTO: 9.6 X10E3/UL (ref 3.4–10.8)

## 2023-12-14 LAB
CONV .: NORMAL
CYTOLOGIST CVX/VAG CYTO: NORMAL
CYTOLOGY CVX/VAG DOC CYTO: NORMAL
CYTOLOGY CVX/VAG DOC THIN PREP: NORMAL
DX ICD CODE: NORMAL
HIV 1 & 2 AB SER-IMP: NORMAL
OTHER STN SPEC: NORMAL
STAT OF ADQ CVX/VAG CYTO-IMP: NORMAL

## 2024-01-12 ENCOUNTER — OFFICE VISIT (OUTPATIENT)
Dept: FAMILY MEDICINE CLINIC | Facility: CLINIC | Age: 24
End: 2024-01-12
Payer: COMMERCIAL

## 2024-01-12 VITALS
RESPIRATION RATE: 20 BRPM | DIASTOLIC BLOOD PRESSURE: 82 MMHG | BODY MASS INDEX: 19.74 KG/M2 | HEIGHT: 67 IN | WEIGHT: 125.8 LBS | SYSTOLIC BLOOD PRESSURE: 122 MMHG | HEART RATE: 69 BPM

## 2024-01-12 DIAGNOSIS — L30.9 DERMATITIS: Primary | ICD-10-CM

## 2024-01-12 PROCEDURE — 99213 OFFICE O/P EST LOW 20 MIN: CPT | Performed by: FAMILY MEDICINE

## 2024-01-12 RX ORDER — PREDNISONE 20 MG/1
20 TABLET ORAL DAILY
Qty: 20 TABLET | Refills: 0 | Status: SHIPPED | OUTPATIENT
Start: 2024-01-12 | End: 2024-01-12 | Stop reason: SDUPTHER

## 2024-01-12 RX ORDER — PREDNISONE 20 MG/1
20 TABLET ORAL DAILY
Qty: 20 TABLET | Refills: 0 | Status: SHIPPED | OUTPATIENT
Start: 2024-01-12 | End: 2024-01-25

## 2024-01-12 NOTE — PROGRESS NOTES
Subjective   Katie Johnson is a 24 y.o. female.   No chief complaint on file.      History of Present Illness  Using aquaphor with only mild relief. No new contactants or make-up. Tried antihistamines without relief   Rash  This is a new problem. The current episode started 1 to 4 weeks ago. The problem is unchanged. The affected locations include the face, left eye and right eye. The rash is characterized by blistering, burning, swelling, redness, pain and itchiness. It is unknown if there was an exposure to a precipitant. Past treatments include antihistamine, anti-itch cream, moisturizer and cold compress. The treatment provided no relief.        The following portions of the patient's history were reviewed and updated as appropriate: allergies, current medications, past family history, past medical history, past social history, past surgical history, and problem list.    Patient Active Problem List   Diagnosis   • Scoliosis (and kyphoscoliosis), idiopathic   • Infected insect bite of right knee   • Junctional nevus of back   • Adolescent idiopathic scoliosis of thoracolumbar spine   • S/P spinal fusion   • Dizziness   • Screening for hyperlipidemia   • Routine general medical examination at a health care facility   • Irregular menses   • Birth control counseling   • HPV vaccine counseling   • Skin lesion of back       Current Outpatient Medications on File Prior to Visit   Medication Sig Dispense Refill   • albuterol sulfate  (90 Base) MCG/ACT inhaler Inhale 2 puffs Every 4 (Four) Hours As Needed for Wheezing. 6.7 g 0   • norethindrone (Ortho Micronor) 0.35 MG tablet Take 1 tablet by mouth Daily. 28 tablet 12   • [DISCONTINUED] amoxicillin-clavulanate (AUGMENTIN) 875-125 MG per tablet Take 1 tablet by mouth 2 (Two) Times a Day. 20 tablet 0   • [DISCONTINUED] methylPREDNISolone (MEDROL) 4 MG dose pack Take as directed on package instructions. 21 tablet 0     No current facility-administered medications  "on file prior to visit.     Current outpatient and discharge medications have been reconciled for the patient.  Reviewed by: Richi Cho MD      No Known Allergies    Review of Systems   Skin:  Positive for rash.     I have reviewed and confirmed the accuracy of the ROS as documented by the MA/LPN/RN Richi Cho MD    Objective   Visit Vitals  /82 (BP Location: Left arm, Patient Position: Sitting, Cuff Size: Adult)   Pulse 69   Resp 20   Ht 170.2 cm (67.01\")   Wt 57.1 kg (125 lb 12.8 oz)   BMI 19.70 kg/m²      **  Physical Exam  Constitutional:       Appearance: She is well-developed.   HENT:      Head: Normocephalic and atraumatic.        Comments: Erythematous eczematous rash to face     Right Ear: External ear normal.      Left Ear: External ear normal.      Nose: Nose normal.   Eyes:      Pupils: Pupils are equal, round, and reactive to light.   Cardiovascular:      Rate and Rhythm: Normal rate and regular rhythm.      Heart sounds: Normal heart sounds.   Pulmonary:      Effort: Pulmonary effort is normal.      Breath sounds: Normal breath sounds.   Abdominal:      General: Bowel sounds are normal.      Palpations: Abdomen is soft.   Musculoskeletal:         General: Normal range of motion.      Cervical back: Normal range of motion and neck supple.   Skin:     General: Skin is warm and dry.   Neurological:      Mental Status: She is alert and oriented to person, place, and time.   Psychiatric:         Behavior: Behavior normal.         Thought Content: Thought content normal.         Judgment: Judgment normal.     Physical Exam   Head   Head comments: Erythematous eczematous rash to face      Ortho Exam   Neurologic Exam     Mental Status   Oriented to person, place, and time.     Cranial Nerves     CN III, IV, VI   Pupils are equal, round, and reactive to light.       Problem List Items Addressed This Visit    None  Visit Diagnoses       Dermatitis    -  Primary    atopic vs " contact.  Around eyes, try prednisone    Relevant Medications    predniSONE (DELTASONE) 20 MG tablet          Findings discussed. All questions answered.  Differential diagnosis discussed.   Follow-up in 2 weeks if not better.  Follow-up sooner for worsening symptoms or for any concerns.  May need derm referral given location if not improved    BMI is within normal parameters. No other follow-up for BMI required.      Expected course, medications, and adverse effects discussed as appropriate.  Call or return if worsening or persistent symptoms.     This document is intended for medical professional use only.

## 2024-01-22 NOTE — PROGRESS NOTES
Subjective   Katie Johnson is a 24 y.o. female. Presents to [facility]    Chief Complaint   Patient presents with    Rash       Rash  This is a new problem. The current episode started more than 1 month ago (2 months). The affected locations include the face. The rash is characterized by dryness, itchiness and redness. She was exposed to nothing. Pertinent negatives include no eye pain, facial edema or rhinorrhea. Treatments tried: prednisone. The treatment provided mild relief.        I personally reviewed and updated the patient's allergies, medications, problem list, and past medical, surgical, social, and family history. I have reviewed and confirmed the accuracy of the History of Present Illness and Review of Symptoms as documented by the MA/LPN/RN. Leda Andrews MD    Allergies:  No Known Allergies    Social History:  Social History     Socioeconomic History    Marital status: Single   Tobacco Use    Smoking status: Never    Smokeless tobacco: Never    Tobacco comments:     No smoke exposure   Vaping Use    Vaping Use: Some days    Substances: Nicotine   Substance and Sexual Activity    Alcohol use: Yes    Sexual activity: Defer       Family History:  Family History   Problem Relation Age of Onset    Hypertension Father     Diabetes Father     Diabetes Paternal Grandfather     Colon cancer Neg Hx     Breast cancer Neg Hx        Past Medical History :  Patient Active Problem List   Diagnosis    Scoliosis (and kyphoscoliosis), idiopathic    Infected insect bite of right knee    Junctional nevus of back    Adolescent idiopathic scoliosis of thoracolumbar spine    S/P spinal fusion    Dizziness    Screening for hyperlipidemia    Routine general medical examination at a health care facility    Irregular menses    Birth control counseling    HPV vaccine counseling    Skin lesion of back    Allergic dermatitis of upper and lower lids of both eyes    Skin change    Polyarthralgia    Bilateral  "wrist pain    Neck pain       Medication List:    Current Outpatient Medications:     naproxen (NAPROSYN) 375 MG tablet, Take 1 tablet by mouth 2 (Two) Times a Day., Disp: 60 tablet, Rfl: 1    norethindrone (Ortho Micronor) 0.35 MG tablet, Take 1 tablet by mouth Daily., Disp: 28 tablet, Rfl: 12    Past Surgical History:  No past surgical history on file.      Physical Exam:      Vital Signs:    Vitals:    01/23/24 1001   BP: 116/78   Pulse: 112   Resp: 17   Temp: 97.5 °F (36.4 °C)   SpO2: 99%        /78 (BP Location: Left arm, Patient Position: Sitting, Cuff Size: Adult)   Pulse 112   Temp 97.5 °F (36.4 °C) (Temporal)   Resp 17   Ht 170.2 cm (67.01\")   Wt 55.1 kg (121 lb 6.4 oz)   LMP 12/25/2023   SpO2 99% Comment: room air  BMI 19.01 kg/m²     Wt Readings from Last 3 Encounters:   01/30/24 55.1 kg (121 lb 6.4 oz)   01/23/24 55.1 kg (121 lb 6.4 oz)   01/12/24 57.1 kg (125 lb 12.8 oz)       Result Review :                Physical Exam  Vitals reviewed.   Constitutional:       Appearance: Normal appearance. She is well-developed.   HENT:      Head: Normocephalic and atraumatic.   Eyes:      General:         Right eye: No discharge.         Left eye: No discharge.     Cardiovascular:      Rate and Rhythm: Normal rate and regular rhythm.      Heart sounds: Normal heart sounds. No murmur heard.     No friction rub. No gallop.   Pulmonary:      Effort: Pulmonary effort is normal. No respiratory distress.      Breath sounds: Normal breath sounds. No wheezing or rales.   Skin:     General: Skin is warm and dry.      Findings: No rash.   Neurological:      Mental Status: She is alert and oriented to person, place, and time.      Coordination: Coordination normal.      Gait: Gait normal.   Psychiatric:         Behavior: Behavior is cooperative.         Assessment and Plan:  Problems Addressed this Visit          Eye    Allergic dermatitis of upper and lower lids of both eyes - Primary     Start " hydrocortisone    Diagnosis, treatment and and course discussed. Potential side effects discussed. Return if there is worsening or persistence of symptoms.     Diagnosis, treatment and and course discussed. Potential side effects discussed. Return if there is worsening or persistence of symptoms.               Diagnoses         Codes Comments    Allergic dermatitis of upper and lower lids of both eyes    -  Primary ICD-10-CM: H01.111, H01.115, H01.114, H01.112  ICD-9-CM: 373.32              BMI is within normal parameters. No other follow-up for BMI required.      An After Visit Summary and PPPS were given to the patient.

## 2024-01-23 ENCOUNTER — OFFICE VISIT (OUTPATIENT)
Dept: FAMILY MEDICINE CLINIC | Facility: CLINIC | Age: 24
End: 2024-01-23
Payer: COMMERCIAL

## 2024-01-23 VITALS
HEART RATE: 112 BPM | WEIGHT: 121.4 LBS | RESPIRATION RATE: 17 BRPM | DIASTOLIC BLOOD PRESSURE: 78 MMHG | BODY MASS INDEX: 19.06 KG/M2 | HEIGHT: 67 IN | SYSTOLIC BLOOD PRESSURE: 116 MMHG | OXYGEN SATURATION: 99 % | TEMPERATURE: 97.5 F

## 2024-01-23 DIAGNOSIS — H01.114 ALLERGIC DERMATITIS OF UPPER AND LOWER LIDS OF BOTH EYES: Primary | ICD-10-CM

## 2024-01-23 DIAGNOSIS — H01.112 ALLERGIC DERMATITIS OF UPPER AND LOWER LIDS OF BOTH EYES: Primary | ICD-10-CM

## 2024-01-23 DIAGNOSIS — H01.115 ALLERGIC DERMATITIS OF UPPER AND LOWER LIDS OF BOTH EYES: Primary | ICD-10-CM

## 2024-01-23 DIAGNOSIS — H01.111 ALLERGIC DERMATITIS OF UPPER AND LOWER LIDS OF BOTH EYES: Primary | ICD-10-CM

## 2024-01-23 PROCEDURE — 99213 OFFICE O/P EST LOW 20 MIN: CPT | Performed by: FAMILY MEDICINE

## 2024-01-23 RX ORDER — DIAPER,BRIEF,INFANT-TODD,DISP
1 EACH MISCELLANEOUS 2 TIMES DAILY
Qty: 30 G | Refills: 1 | Status: SHIPPED | OUTPATIENT
Start: 2024-01-23 | End: 2024-01-30

## 2024-01-26 NOTE — PROGRESS NOTES
Subjective   Katie Johnson is a 24 y.o. female. Presents to [facility]    Chief Complaint   Patient presents with    Rash       Rash  This is a new problem. The current episode started more than 1 month ago (2-3 months). The problem has been resolved since onset. The affected locations include the face. The rash is characterized by itchiness and redness. She was exposed to nothing. Pertinent negatives include no eye pain, facial edema or rhinorrhea. Treatments tried: prednisone, hydrocortisone 1% The treatment provided significant relief.   Hand Pain   There was no injury mechanism. Pain location: she has noticed paling of skin on fingers and soreness. The pain is mild. The pain has been Fluctuating since the incident. Pertinent negatives include no muscle weakness, numbness or tingling. She has tried nothing for the symptoms.        I personally reviewed and updated the patient's allergies, medications, problem list, and past medical, surgical, social, and family history. I have reviewed and confirmed the accuracy of the History of Present Illness and Review of Symptoms as documented by the MA/LPN/RN. Leda Andrews MD    Allergies:  No Known Allergies    Social History:  Social History     Socioeconomic History    Marital status: Single   Tobacco Use    Smoking status: Never    Smokeless tobacco: Never    Tobacco comments:     No smoke exposure   Vaping Use    Vaping Use: Some days    Substances: Nicotine   Substance and Sexual Activity    Alcohol use: Yes    Sexual activity: Defer       Family History:  Family History   Problem Relation Age of Onset    Hypertension Father     Diabetes Father     Diabetes Paternal Grandfather     Colon cancer Neg Hx     Breast cancer Neg Hx        Past Medical History :  Patient Active Problem List   Diagnosis    Scoliosis (and kyphoscoliosis), idiopathic    Infected insect bite of right knee    Junctional nevus of back    Adolescent idiopathic scoliosis of  "thoracolumbar spine    S/P spinal fusion    Dizziness    Screening for hyperlipidemia    Routine general medical examination at a health care facility    Irregular menses    Birth control counseling    HPV vaccine counseling    Skin lesion of back    Allergic dermatitis of upper and lower lids of both eyes    Skin change    Polyarthralgia    Bilateral wrist pain    Neck pain       Medication List:    Current Outpatient Medications:     naproxen (NAPROSYN) 375 MG tablet, Take 1 tablet by mouth 2 (Two) Times a Day., Disp: 60 tablet, Rfl: 1    norethindrone (Ortho Micronor) 0.35 MG tablet, Take 1 tablet by mouth Daily., Disp: 28 tablet, Rfl: 12    Past Surgical History:  No past surgical history on file.      Physical Exam:      Vital Signs:    Vitals:    01/30/24 1159   BP: 122/80   Pulse: 94   Resp: 16   Temp: 97.1 °F (36.2 °C)   SpO2: 98%        /80 (BP Location: Left arm, Patient Position: Sitting, Cuff Size: Adult)   Pulse 94   Temp 97.1 °F (36.2 °C) (Temporal)   Resp 16   Ht 170.2 cm (67.01\")   Wt 55.1 kg (121 lb 6.4 oz)   LMP 01/23/2024   SpO2 98% Comment: room air  BMI 19.01 kg/m²     Wt Readings from Last 3 Encounters:   01/30/24 55.1 kg (121 lb 6.4 oz)   01/23/24 55.1 kg (121 lb 6.4 oz)   01/12/24 57.1 kg (125 lb 12.8 oz)       Result Review :                Physical Exam  Vitals reviewed.   Constitutional:       Appearance: Normal appearance. She is well-developed.   HENT:      Head: Normocephalic and atraumatic.   Eyes:      General:         Right eye: No discharge.         Left eye: No discharge.   Cardiovascular:      Rate and Rhythm: Normal rate and regular rhythm.      Heart sounds: Normal heart sounds. No murmur heard.     No friction rub. No gallop.   Pulmonary:      Effort: Pulmonary effort is normal. No respiratory distress.      Breath sounds: Normal breath sounds. No wheezing or rales.   Musculoskeletal:      Right wrist: No tenderness.      Left wrist: No tenderness.      Right " hand: No tenderness. Normal range of motion. Normal strength. Normal capillary refill. Normal pulse.      Left hand: No tenderness. Normal range of motion. Normal capillary refill. Normal pulse.   Skin:     General: Skin is warm and dry.      Findings: No rash.   Neurological:      Mental Status: She is alert and oriented to person, place, and time.      Coordination: Coordination normal.      Gait: Gait normal.   Psychiatric:         Behavior: Behavior is cooperative.         Assessment and Plan:  Problems Addressed this Visit          Eye    Allergic dermatitis of upper and lower lids of both eyes - Primary     resolved         Relevant Medications    naproxen (NAPROSYN) 375 MG tablet       Musculoskeletal and Injuries    Polyarthralgia    Relevant Medications    naproxen (NAPROSYN) 375 MG tablet    Other Relevant Orders    C-reactive Protein (Completed)    ESSENCE by IFA, Reflex to Titer and Pattern (Completed)    Rheumatoid Factor (Completed)    Bilateral wrist pain     Will check xray and emg         Relevant Medications    naproxen (NAPROSYN) 375 MG tablet    Other Relevant Orders    EMG & Nerve Conduction Test    XR Wrist 3+ View Bilateral (Completed)    Neck pain    Relevant Medications    naproxen (NAPROSYN) 375 MG tablet    Other Relevant Orders    XR Spine Cervical Complete 4 or 5 View (Completed)       Skin    Skin change     Fingers when cold  Sounds like raynauds.  Neurovascularly intact.           Diagnoses         Codes Comments    Allergic dermatitis of upper and lower lids of both eyes    -  Primary ICD-10-CM: H01.111, H01.115, H01.114, H01.112  ICD-9-CM: 373.32     Skin change     ICD-10-CM: R23.9  ICD-9-CM: 782.9     Polyarthralgia     ICD-10-CM: M25.50  ICD-9-CM: 719.49     Bilateral wrist pain     ICD-10-CM: M25.531, M25.532  ICD-9-CM: 719.43     Neck pain     ICD-10-CM: M54.2  ICD-9-CM: 723.1              BMI is within normal parameters. No other follow-up for BMI required.      An After Visit  Summary and PPPS were given to the patient.

## 2024-01-30 ENCOUNTER — HOSPITAL ENCOUNTER (OUTPATIENT)
Dept: GENERAL RADIOLOGY | Facility: HOSPITAL | Age: 24
Discharge: HOME | End: 2024-01-30
Payer: COMMERCIAL

## 2024-01-30 ENCOUNTER — OFFICE VISIT (OUTPATIENT)
Dept: FAMILY MEDICINE CLINIC | Facility: CLINIC | Age: 24
End: 2024-01-30
Payer: COMMERCIAL

## 2024-01-30 VITALS
DIASTOLIC BLOOD PRESSURE: 80 MMHG | RESPIRATION RATE: 16 BRPM | HEIGHT: 67 IN | WEIGHT: 121.4 LBS | SYSTOLIC BLOOD PRESSURE: 122 MMHG | HEART RATE: 94 BPM | TEMPERATURE: 97.1 F | BODY MASS INDEX: 19.06 KG/M2 | OXYGEN SATURATION: 98 %

## 2024-01-30 DIAGNOSIS — M25.50 POLYARTHRALGIA: ICD-10-CM

## 2024-01-30 DIAGNOSIS — M54.2 NECK PAIN: ICD-10-CM

## 2024-01-30 DIAGNOSIS — H01.114 ALLERGIC DERMATITIS OF UPPER AND LOWER LIDS OF BOTH EYES: Primary | ICD-10-CM

## 2024-01-30 DIAGNOSIS — M25.532 BILATERAL WRIST PAIN: ICD-10-CM

## 2024-01-30 DIAGNOSIS — H01.111 ALLERGIC DERMATITIS OF UPPER AND LOWER LIDS OF BOTH EYES: Primary | ICD-10-CM

## 2024-01-30 DIAGNOSIS — H01.115 ALLERGIC DERMATITIS OF UPPER AND LOWER LIDS OF BOTH EYES: Primary | ICD-10-CM

## 2024-01-30 DIAGNOSIS — H01.112 ALLERGIC DERMATITIS OF UPPER AND LOWER LIDS OF BOTH EYES: Primary | ICD-10-CM

## 2024-01-30 DIAGNOSIS — R23.9 SKIN CHANGE: ICD-10-CM

## 2024-01-30 DIAGNOSIS — M25.531 BILATERAL WRIST PAIN: ICD-10-CM

## 2024-01-30 PROCEDURE — 73110 X-RAY EXAM OF WRIST: CPT

## 2024-01-30 PROCEDURE — 72050 X-RAY EXAM NECK SPINE 4/5VWS: CPT

## 2024-01-30 RX ORDER — NAPROXEN 375 MG/1
375 TABLET ORAL 2 TIMES DAILY
Qty: 60 TABLET | Refills: 1 | Status: SHIPPED | OUTPATIENT
Start: 2024-01-30

## 2024-01-31 LAB
CRP SERPL-MCNC: <1 MG/L (ref 0–10)
RHEUMATOID FACT SERPL-ACNC: <10 IU/ML

## 2024-02-01 NOTE — ASSESSMENT & PLAN NOTE
Start hydrocortisone    Diagnosis, treatment and and course discussed. Potential side effects discussed. Return if there is worsening or persistence of symptoms.     Diagnosis, treatment and and course discussed. Potential side effects discussed. Return if there is worsening or persistence of symptoms.

## 2024-02-02 LAB — ANA SER QL IF: NEGATIVE

## 2024-03-08 NOTE — PROGRESS NOTES
"  Chief Complaint   Patient presents with    Annual Exam         Subjective   Katie Johnson is a 23 y.o. female here for a Annual Visit.     Last Physical Exam: 09/08/2022 Previous physical was performed by  Leda Andrews MD  General Health:good  Contraceptive History:Oral  Nutrition:in general, an \"unhealthy\" diet  Exercise Level:not at all  Sleep:fairly well  Hours of Sleep:6  Libido:good  Self Skin Exam:  Pt states she checks every other day   Monthly Breast Self Exam:Performs monthly self breast exam    Pap Smear:  Last Completed Pap Smear            PAP SMEAR (Every 3 Years) Next due on 12/11/2026 12/11/2023  IGP, Rfx Aptima HPV ASCU                 Findings on last pap: patient has never had a pap test}    Mammogram:   Last Completed Mammogram       This patient has no relevant Health Maintenance data.         she has never had a mammogram    Bone Dexa scan: Pt has not had Dexa Scan     Colonoscopy:   Last Completed Colonoscopy       This patient has no relevant Health Maintenance data.          Pt has not had colonoscopy          I personally reviewed and updated the patient's allergies, medications, problem list, and past medical, surgical, social, and family history.     Allergies:  No Known Allergies    Social History:  Social History     Socioeconomic History    Marital status: Single   Tobacco Use    Smoking status: Never    Smokeless tobacco: Never    Tobacco comments:     No smoke exposure   Vaping Use    Vaping Use: Some days    Substances: Nicotine   Substance and Sexual Activity    Alcohol use: Yes    Sexual activity: Defer       Family History:  Family History   Problem Relation Age of Onset    Hypertension Father     Diabetes Father     Diabetes Paternal Grandfather     Colon cancer Neg Hx     Breast cancer Neg Hx        Past Medical History :  Active Ambulatory Problems     Diagnosis Date Noted    Scoliosis (and kyphoscoliosis), idiopathic 09/30/2015    Infected insect bite of right " "knee 05/28/2020    Junctional nevus of back 06/04/2020    Adolescent idiopathic scoliosis of thoracolumbar spine 07/17/2020    S/P spinal fusion 02/11/2021    Dizziness 07/16/2021    Screening for hyperlipidemia 07/27/2021    Routine general medical examination at a health care facility 07/27/2021    Irregular menses 08/24/2022    Birth control counseling 09/08/2022    HPV vaccine counseling 09/08/2022    Skin lesion of back 09/08/2022     Resolved Ambulatory Problems     Diagnosis Date Noted    Postoperative anemia due to acute blood loss 09/19/2020    Encounter for surveillance of contraceptives 07/16/2021     Past Medical History:   Diagnosis Date    Cellulitis of knee, left     Lice infested hair     Other viral warts     Plantar wart, right foot        Medication List:    Current Outpatient Medications:     albuterol sulfate  (90 Base) MCG/ACT inhaler, Inhale 2 puffs Every 4 (Four) Hours As Needed for Wheezing., Disp: 6.7 g, Rfl: 0    amoxicillin-clavulanate (AUGMENTIN) 875-125 MG per tablet, Take 1 tablet by mouth 2 (Two) Times a Day., Disp: 20 tablet, Rfl: 0    methylPREDNISolone (MEDROL) 4 MG dose pack, Take as directed on package instructions., Disp: 21 tablet, Rfl: 0    norethindrone (Ortho Micronor) 0.35 MG tablet, Take 1 tablet by mouth Daily., Disp: 28 tablet, Rfl: 12    Past Surgical History:  History reviewed. No pertinent surgical history.    Depression Screen:       12/11/2023     3:15 PM   PHQ-2/PHQ-9 Depression Screening   Little Interest or Pleasure in Doing Things 0-->not at all   Feeling Down, Depressed or Hopeless 0-->not at all   PHQ-9: Brief Depression Severity Measure Score 0       Fall Risk Screen:  JIMMIEADI Fall Risk Assessment has not been completed.        Physical Exam:  Vital Signs:  Visit Vitals  /70 (BP Location: Left arm, Patient Position: Sitting, Cuff Size: Adult)   Pulse 120   Temp 97.1 °F (36.2 °C) (Temporal)   Resp 18   Ht 170.2 cm (67.01\")   Wt 54.7 kg (120 lb 9.6 " oz)   SpO2 97%   BMI 18.88 kg/m²       Body mass index is 18.88 kg/m².      Result Review :                Review of Systems   Constitutional:  Negative for activity change and fever.   HENT:  Negative for ear pain, rhinorrhea, sinus pressure and voice change.    Eyes:  Negative for visual disturbance.   Respiratory:  Negative for cough and shortness of breath.    Cardiovascular:  Negative for chest pain.   Gastrointestinal:  Negative for abdominal pain, diarrhea, nausea and vomiting.   Endocrine: Negative for cold intolerance and heat intolerance.   Genitourinary:  Negative for frequency and urgency.   Musculoskeletal:  Negative for arthralgias.   Skin:  Negative for rash.   Neurological:  Negative for syncope.   Hematological:  Does not bruise/bleed easily.   Psychiatric/Behavioral:  Negative for depressed mood. The patient is not nervous/anxious.         Physical Exam  Vitals and nursing note reviewed.   Constitutional:       General: She is not in acute distress.     Appearance: She is well-developed. She is not diaphoretic.   HENT:      Head: Normocephalic and atraumatic.      Right Ear: Tympanic membrane and external ear normal.      Left Ear: Tympanic membrane and external ear normal.      Nose: Nose normal.      Mouth/Throat:      Pharynx: No oropharyngeal exudate.   Eyes:      General: No scleral icterus.        Right eye: No discharge.         Left eye: No discharge.      Conjunctiva/sclera: Conjunctivae normal.      Pupils: Pupils are equal, round, and reactive to light.   Neck:      Thyroid: No thyromegaly.      Trachea: No tracheal deviation.   Cardiovascular:      Rate and Rhythm: Normal rate and regular rhythm.      Heart sounds: Normal heart sounds. No murmur heard.     No friction rub. No gallop.   Pulmonary:      Effort: Pulmonary effort is normal. No respiratory distress.      Breath sounds: Normal breath sounds. No stridor. No wheezing or rales.   Chest:   Breasts:     Right: No inverted nipple,  mass, nipple discharge, skin change or tenderness.      Left: No inverted nipple, mass, nipple discharge, skin change or tenderness.   Abdominal:      General: Bowel sounds are normal. There is no distension.      Palpations: Abdomen is soft. There is no mass.      Tenderness: There is no abdominal tenderness. There is no guarding or rebound.   Genitourinary:     Labia:         Right: No rash, tenderness or lesion.         Left: No rash, tenderness or lesion.       Vagina: No vaginal discharge, erythema, bleeding or lesions.      Cervix: No discharge or lesion.      Adnexa:         Right: No mass or tenderness.          Left: No mass or tenderness.     Musculoskeletal:         General: No tenderness or deformity. Normal range of motion.      Cervical back: Normal range of motion and neck supple.   Lymphadenopathy:      Cervical: No cervical adenopathy.   Skin:     General: Skin is warm and dry.      Capillary Refill: Capillary refill takes less than 2 seconds.      Coloration: Skin is not pale.      Findings: No erythema or rash.   Neurological:      General: No focal deficit present.      Mental Status: She is alert and oriented to person, place, and time.      Cranial Nerves: No cranial nerve deficit.      Sensory: No sensory deficit.      Motor: No tremor, atrophy or abnormal muscle tone.      Coordination: Coordination normal.      Gait: Gait normal.      Deep Tendon Reflexes: Reflexes are normal and symmetric. Reflexes normal.   Psychiatric:         Behavior: Behavior normal.         Thought Content: Thought content normal.         Cognition and Memory: Memory is not impaired. She does not exhibit impaired recent memory or impaired remote memory.         Judgment: Judgment normal.       Assessment and Plan:  Problem List Items Addressed This Visit          Cardiac and Vasculature    Screening for hyperlipidemia    Relevant Orders    Comprehensive Metabolic Panel (Completed)    Lipid Panel With / Chol / HDL  Ratio (Completed)     Other Visit Diagnoses       Encounter for wellness examination    -  Primary    Relevant Orders    CBC & Differential (Completed)    TSH (Completed)    Screening for cervical cancer        Relevant Orders    IGP, Rfx Aptima HPV ASCU (Completed)          BMI is within normal parameters. No other follow-up for BMI required.       An After Visit Summary and PPPS were given to the patient.       Discussed injury prevention, diet and exercise, safe sexual practices, and screening for common diseases. Encouraged use of sunscreen and seatbelts. Discussed timing of  cervical cancer screening. Encouraged monthly self-breast exams, yearly clinical breast exams, and discussed timing of mammograms. Avoidance of tobacco encouraged. Limitation or avoidance of alcohol encouraged. Recommend yearly dental and eye exams. Also discussed monitoring of blood pressure, lipids.    Admission

## 2025-01-15 ENCOUNTER — OFFICE VISIT (OUTPATIENT)
Dept: FAMILY MEDICINE CLINIC | Facility: CLINIC | Age: 25
End: 2025-01-15
Payer: COMMERCIAL

## 2025-01-15 VITALS
TEMPERATURE: 98.7 F | SYSTOLIC BLOOD PRESSURE: 116 MMHG | WEIGHT: 124 LBS | DIASTOLIC BLOOD PRESSURE: 74 MMHG | OXYGEN SATURATION: 98 % | HEIGHT: 67 IN | RESPIRATION RATE: 18 BRPM | HEART RATE: 100 BPM | BODY MASS INDEX: 19.46 KG/M2

## 2025-01-15 DIAGNOSIS — J06.9 UPPER RESPIRATORY TRACT INFECTION, UNSPECIFIED TYPE: ICD-10-CM

## 2025-01-15 DIAGNOSIS — J01.00 SUBACUTE MAXILLARY SINUSITIS: Primary | ICD-10-CM

## 2025-01-15 PROBLEM — M25.532 BILATERAL WRIST PAIN: Status: RESOLVED | Noted: 2024-01-30 | Resolved: 2025-01-15

## 2025-01-15 PROBLEM — M54.2 NECK PAIN: Status: RESOLVED | Noted: 2024-01-30 | Resolved: 2025-01-15

## 2025-01-15 PROBLEM — L08.9 INFECTED INSECT BITE OF RIGHT KNEE: Status: RESOLVED | Noted: 2020-05-28 | Resolved: 2025-01-15

## 2025-01-15 PROBLEM — S80.261A INFECTED INSECT BITE OF RIGHT KNEE: Status: RESOLVED | Noted: 2020-05-28 | Resolved: 2025-01-15

## 2025-01-15 PROBLEM — M25.531 BILATERAL WRIST PAIN: Status: RESOLVED | Noted: 2024-01-30 | Resolved: 2025-01-15
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
## 2025-01-15 LAB
EXPIRATION DATE: NORMAL
FLUAV AG UPPER RESP QL IA.RAPID: NOT DETECTED
FLUBV AG UPPER RESP QL IA.RAPID: NOT DETECTED
INTERNAL CONTROL: NORMAL
Lab: NORMAL
SARS-COV-2 AG UPPER RESP QL IA.RAPID: NOT DETECTED

## 2025-01-15 PROCEDURE — 87428 SARSCOV & INF VIR A&B AG IA: CPT | Performed by: FAMILY MEDICINE

## 2025-01-15 PROCEDURE — 99213 OFFICE O/P EST LOW 20 MIN: CPT | Performed by: FAMILY MEDICINE

## 2025-01-15 RX ORDER — PREDNISONE 10 MG/1
10 TABLET ORAL TAKE AS DIRECTED
Qty: 16 TABLET | Refills: 0 | Status: SHIPPED | OUTPATIENT
Start: 2025-01-15 | End: 2025-01-25

## 2025-01-15 RX ORDER — DOXYCYCLINE 100 MG/1
100 CAPSULE ORAL 2 TIMES DAILY
Qty: 20 CAPSULE | Refills: 0 | Status: SHIPPED | OUTPATIENT
Start: 2025-01-15

## 2025-01-15 NOTE — PROGRESS NOTES
"Chief Complaint  URI    Subjective     CC  Problem List  Visit Diagnosis   Encounters  Notes  Medications  Labs  Result Review Imaging  Media    Katie Johnson presents to [facility] for   URI   This is a new problem. The current episode started yesterday. The problem has been gradually worsening. The maximum temperature recorded prior to her arrival was 101 - 101.9 F. Associated symptoms include congestion, coughing, ear pain, headaches, a plugged ear sensation, rhinorrhea, sinus pain, a sore throat and wheezing. Pertinent negatives include no abdominal pain, chest pain, diarrhea, dysuria, joint pain, joint swelling, nausea, neck pain, rash, sneezing, swollen glands or vomiting. Associated symptoms comments: Loss of appetite, fatigue . She has tried decongestant, NSAIDs and sleep for the symptoms. The treatment provided mild relief.     Review of Systems   HENT:  Positive for congestion, ear pain, rhinorrhea and sore throat. Negative for sneezing and swollen glands.    Respiratory:  Positive for cough and wheezing.    Cardiovascular:  Negative for chest pain.   Gastrointestinal:  Negative for abdominal pain, diarrhea, nausea and vomiting.   Genitourinary:  Negative for dysuria.   Musculoskeletal:  Negative for joint pain and neck pain.   Skin:  Negative for rash.        Objective   Vital Signs:   /74 (BP Location: Right arm, Patient Position: Sitting, Cuff Size: Adult)   Pulse 100   Temp 98.7 °F (37.1 °C) (Temporal)   Resp 18   Ht 170.2 cm (67.01\")   Wt 56.2 kg (124 lb)   SpO2 98%   BMI 19.42 kg/m²     Physical Exam  Constitutional:       General: She is not in acute distress.  HENT:      Right Ear: Tympanic membrane normal.      Left Ear: Tympanic membrane normal.      Nose:      Right Sinus: Maxillary sinus tenderness present.      Left Sinus: Maxillary sinus tenderness present.      Mouth/Throat:      Pharynx: No oropharyngeal exudate or posterior " oropharyngeal erythema.   Neck:      Thyroid: No thyromegaly.   Cardiovascular:      Rate and Rhythm: Normal rate.      Heart sounds: No murmur heard.  Pulmonary:      Effort: Pulmonary effort is normal.      Breath sounds: Normal breath sounds. No wheezing.   Lymphadenopathy:      Cervical: No cervical adenopathy.      Right cervical: No superficial cervical adenopathy.     Left cervical: No superficial cervical adenopathy.   Skin:     Findings: No rash.   Neurological:      Mental Status: She is alert.        Result Review :Labs  Result Review  Imaging  Med Tab  Media                 Assessment and Plan CC Problem List  Visit Diagnosis  ROS  Review (Popup)  Health Maintenance  Quality  BestPractice  Medications  SmartSets  SnapShot Encounters  Media  Problem List Items Addressed This Visit    None  Visit Diagnoses       Subacute maxillary sinusitis    -  Primary    abx fluids, saline flushes, humidity, steroids follow up should sxs not improve over 48 hrs and resolve over 1 week.    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  Patient was given instructions and counseling regarding her condition or for health maintenance advice. Please see specific information pulled into the AVS if appropriate.

## 2025-01-15 NOTE — LETTER
January 15, 2025     Patient: Katie Johnson   YOB: 2000   Date of Visit: 1/15/2025       To Whom It May Concern:    Katie Johnson is ill and unable to work 01/15/2025. She may return 01/16/2025.             Sincerely,        Jo Jacobs MD    CC: No Recipients

## 2025-05-12 ENCOUNTER — OFFICE VISIT (OUTPATIENT)
Dept: FAMILY MEDICINE CLINIC | Facility: CLINIC | Age: 25
End: 2025-05-12
Payer: COMMERCIAL

## 2025-05-12 VITALS
TEMPERATURE: 99.1 F | BODY MASS INDEX: 19.34 KG/M2 | DIASTOLIC BLOOD PRESSURE: 62 MMHG | HEART RATE: 78 BPM | HEIGHT: 67 IN | OXYGEN SATURATION: 98 % | WEIGHT: 123.2 LBS | SYSTOLIC BLOOD PRESSURE: 104 MMHG | RESPIRATION RATE: 18 BRPM

## 2025-05-12 DIAGNOSIS — B36.0 TINEA VERSICOLOR: Primary | ICD-10-CM

## 2025-05-12 PROCEDURE — 99213 OFFICE O/P EST LOW 20 MIN: CPT | Performed by: FAMILY MEDICINE

## 2025-05-12 RX ORDER — CLOTRIMAZOLE AND BETAMETHASONE DIPROPIONATE 10; .64 MG/G; MG/G
1 CREAM TOPICAL 2 TIMES DAILY
Qty: 45 G | Refills: 6 | Status: SHIPPED | OUTPATIENT
Start: 2025-05-12

## 2025-05-12 RX ORDER — FLUCONAZOLE 200 MG/1
200 TABLET ORAL DAILY
Qty: 5 TABLET | Refills: 0 | Status: SHIPPED | OUTPATIENT
Start: 2025-05-12

## 2025-05-12 NOTE — PROGRESS NOTES
"Chief Complaint  Rash (Bilateral Breast)    Subjective     CC  Problem List  Visit Diagnosis   Encounters  Notes  Medications  Labs  Result Review Imaging  Media    Katie Johnson presents to [facility] for   Rash  Chronicity:  New  Onset:  In the past 7 days  Progression since onset:  Worsening  Location: Breasts.  Characteristics:  Itchiness, redness, scaling and dryness  Exposed to:  Nothing  Associated symptoms: no fever and no shortness of breath    Treatments tried:  Moisturizer  Improvement on treatment:  No relief      Review of Systems   Constitutional:  Negative for appetite change, fever and unexpected weight loss.   Respiratory:  Negative for shortness of breath.    Cardiovascular:  Negative for chest pain and palpitations.   Endocrine: Negative for cold intolerance, heat intolerance, polydipsia and polyuria.   Genitourinary:  Negative for breast discharge, breast lump and breast pain.   Skin:  Positive for rash (about the areola of the breast bilaterally.).   Hematological:  Negative for adenopathy. Does not bruise/bleed easily.        Objective   Vital Signs:   /62 (BP Location: Right arm, Patient Position: Sitting, Cuff Size: Adult)   Pulse 78   Temp 99.1 °F (37.3 °C) (Temporal)   Resp 18   Ht 170.2 cm (67.01\")   Wt 55.9 kg (123 lb 3.2 oz)   SpO2 98%   BMI 19.29 kg/m²     Physical Exam  Constitutional:       General: She is not in acute distress.  Cardiovascular:      Rate and Rhythm: Normal rate and regular rhythm.      Heart sounds: No murmur heard.  Pulmonary:      Effort: Pulmonary effort is normal.      Breath sounds: Normal breath sounds.   Musculoskeletal:      Cervical back: Neck supple.   Lymphadenopathy:      Cervical: No cervical adenopathy.   Skin:     Findings: Rash (There is a dry pink rash circular about the areaola extending into the breast bilaterally worse on the right.) present.   Neurological:      Mental Status: She is " alert.        Result Review :Labs  Result Review  Imaging  Med Tab  Media                 Assessment and Plan CC Problem List  Visit Diagnosis  ROS  Review (Popup)  Health Maintenance  Quality  BestPractice  Medications  SmartSets  SnapShot Encounters  Media  Problem List Items Addressed This Visit    None  Visit Diagnoses         Tinea versicolor    -  Primary    breast bilaterally possible eczema. Rx for fungus if no improvement stronger topical steroids follow up if no improvement.    Relevant Medications    clotrimazole-betamethasone (LOTRISONE) 1-0.05 % cream    fluconazole (Diflucan) 200 MG tablet            Follow Up Instructions Charge Capture  Follow-up Communications  Return if symptoms worsen or fail to improve.  Patient was given instructions and counseling regarding her condition or for health maintenance advice. Please see specific information pulled into the AVS if appropriate.